# Patient Record
Sex: FEMALE | Race: WHITE | URBAN - METROPOLITAN AREA
[De-identification: names, ages, dates, MRNs, and addresses within clinical notes are randomized per-mention and may not be internally consistent; named-entity substitution may affect disease eponyms.]

---

## 2017-09-01 ENCOUNTER — IMPORTED ENCOUNTER (OUTPATIENT)
Dept: URBAN - METROPOLITAN AREA CLINIC 50 | Facility: CLINIC | Age: 52
End: 2017-09-01

## 2017-09-18 ENCOUNTER — IMPORTED ENCOUNTER (OUTPATIENT)
Dept: URBAN - METROPOLITAN AREA CLINIC 50 | Facility: CLINIC | Age: 52
End: 2017-09-18

## 2017-10-19 ENCOUNTER — IMPORTED ENCOUNTER (OUTPATIENT)
Dept: URBAN - METROPOLITAN AREA CLINIC 50 | Facility: CLINIC | Age: 52
End: 2017-10-19

## 2017-11-28 ENCOUNTER — IMPORTED ENCOUNTER (OUTPATIENT)
Dept: URBAN - METROPOLITAN AREA CLINIC 50 | Facility: CLINIC | Age: 52
End: 2017-11-28

## 2018-06-14 ENCOUNTER — IMPORTED ENCOUNTER (OUTPATIENT)
Dept: URBAN - METROPOLITAN AREA CLINIC 50 | Facility: CLINIC | Age: 53
End: 2018-06-14

## 2018-06-25 ENCOUNTER — IMPORTED ENCOUNTER (OUTPATIENT)
Dept: URBAN - METROPOLITAN AREA CLINIC 50 | Facility: CLINIC | Age: 53
End: 2018-06-25

## 2018-10-01 ENCOUNTER — IMPORTED ENCOUNTER (OUTPATIENT)
Dept: URBAN - METROPOLITAN AREA CLINIC 50 | Facility: CLINIC | Age: 53
End: 2018-10-01

## 2018-10-01 NOTE — PATIENT DISCUSSION
"""Start Artificial tears both eyes two - four times a day ."" ""Start FML Fluorometholone both eyes twice a day twice a day

## 2018-10-16 ENCOUNTER — IMPORTED ENCOUNTER (OUTPATIENT)
Dept: URBAN - METROPOLITAN AREA CLINIC 50 | Facility: CLINIC | Age: 53
End: 2018-10-16

## 2018-11-12 ENCOUNTER — IMPORTED ENCOUNTER (OUTPATIENT)
Dept: URBAN - METROPOLITAN AREA CLINIC 50 | Facility: CLINIC | Age: 53
End: 2018-11-12

## 2018-11-19 ENCOUNTER — IMPORTED ENCOUNTER (OUTPATIENT)
Dept: URBAN - METROPOLITAN AREA CLINIC 50 | Facility: CLINIC | Age: 53
End: 2018-11-19

## 2018-11-26 ENCOUNTER — IMPORTED ENCOUNTER (OUTPATIENT)
Dept: URBAN - METROPOLITAN AREA CLINIC 50 | Facility: CLINIC | Age: 53
End: 2018-11-26

## 2018-11-26 NOTE — PATIENT DISCUSSION
"""Patient given final contact lens prescription.  Instructed patient to discontinue contact lens wear and ""

## 2018-11-30 ENCOUNTER — IMPORTED ENCOUNTER (OUTPATIENT)
Dept: URBAN - METROPOLITAN AREA CLINIC 50 | Facility: CLINIC | Age: 53
End: 2018-11-30

## 2019-10-14 ENCOUNTER — IMPORTED ENCOUNTER (OUTPATIENT)
Dept: URBAN - METROPOLITAN AREA CLINIC 50 | Facility: CLINIC | Age: 54
End: 2019-10-14

## 2019-11-14 ENCOUNTER — IMPORTED ENCOUNTER (OUTPATIENT)
Dept: URBAN - METROPOLITAN AREA CLINIC 50 | Facility: CLINIC | Age: 54
End: 2019-11-14

## 2021-03-25 ENCOUNTER — IMPORTED ENCOUNTER (OUTPATIENT)
Dept: URBAN - METROPOLITAN AREA CLINIC 50 | Facility: CLINIC | Age: 56
End: 2021-03-25

## 2021-03-26 ENCOUNTER — IMPORTED ENCOUNTER (OUTPATIENT)
Dept: URBAN - METROPOLITAN AREA CLINIC 50 | Facility: CLINIC | Age: 56
End: 2021-03-26

## 2021-05-15 ASSESSMENT — TONOMETRY
OS_IOP_MMHG: 14
OS_IOP_MMHG: 12
OD_IOP_MMHG: 13
OS_IOP_MMHG: 14
OS_IOP_MMHG: 15
OD_IOP_MMHG: 14
OS_IOP_MMHG: 13
OD_IOP_MMHG: 16
OD_IOP_MMHG: 14
OD_IOP_MMHG: 13
OS_IOP_MMHG: 14
OD_IOP_MMHG: 10
OD_IOP_MMHG: 12
OS_IOP_MMHG: 12

## 2021-05-15 ASSESSMENT — VISUAL ACUITY
OD_CC: 20/60
OD_PH: 20/20-1
OS_CC: J1+ (-2)
OD_CC: 20/60
OD_CC: 20/30
OS_CC: 20/20
OD_OTHER: 20/50. 20/70.
OS_BAT: 20/50
OD_CC: 20/200
OS_CC: J1+
OS_CC: 20/20
OD_CC: J2
OD_CC: J1+
OS_CC: 20/25+2
OD_PH: @ 17 IN
OD_SC: 20/70
OS_CC: 20/25
OS_CC: J1
OD_CC: 20/80
OD_CC: 20/40-2
OD_CC: J1+ (-2)
OS_CC: J1@ 14 IN
OD_BAT: 20/50
OD_CC: J1@ 14 IN
OS_OTHER: 20/50. 20/70.
OS_CC: J2
OS_SC: 20/30
OD_PH: 20/25
OS_CC: 20/20
OS_CC: 20/20-1
OS_CC: 20/25
OD_CC: J1

## 2021-11-23 ENCOUNTER — PREPPED CHART (OUTPATIENT)
Dept: URBAN - METROPOLITAN AREA CLINIC 48 | Facility: CLINIC | Age: 56
End: 2021-11-23

## 2022-10-31 ENCOUNTER — ESTABLISHED PATIENT (OUTPATIENT)
Dept: URBAN - METROPOLITAN AREA CLINIC 53 | Facility: CLINIC | Age: 57
End: 2022-10-31

## 2022-10-31 DIAGNOSIS — Z97.3: ICD-10-CM

## 2022-10-31 DIAGNOSIS — H52.13: ICD-10-CM

## 2022-10-31 DIAGNOSIS — Z01.01: ICD-10-CM

## 2022-10-31 PROCEDURE — 92014 COMPRE OPH EXAM EST PT 1/>: CPT

## 2022-10-31 PROCEDURE — 92310-3E ESTABLISHED CL PATIENT MULTIFOCAL AND/OR MONOVISION SOFT LENS EVALUATION

## 2022-10-31 PROCEDURE — 92015 DETERMINE REFRACTIVE STATE: CPT

## 2022-10-31 ASSESSMENT — TONOMETRY
OD_IOP_MMHG: 12
OS_IOP_MMHG: 12

## 2022-10-31 ASSESSMENT — KERATOMETRY
OD_AXISANGLE_DEGREES: 87
OS_K2POWER_DIOPTERS: 44.25
OS_AXISANGLE_DEGREES: 170
OS_K1POWER_DIOPTERS: 43.25
OD_K1POWER_DIOPTERS: 44.50
OD_AXISANGLE2_DEGREES: 177
OS_AXISANGLE2_DEGREES: 80

## 2022-10-31 ASSESSMENT — VISUAL ACUITY
OD_GLARE: 20/30
OS_CC: 20/25
OD_CC: 20/200
OD_GLARE: 20/40
OS_GLARE: 20/40
OS_GLARE: 20/40
OD_PH: 20/30
OU_CC: J1+ @ 18 IN

## 2022-10-31 NOTE — PATIENT DISCUSSION
Monovision. 2 week replacement. Trial 1 ordered for patient to  and call to finalize trial or current RX.

## 2023-12-13 ENCOUNTER — COMPREHENSIVE EXAM (OUTPATIENT)
Dept: URBAN - METROPOLITAN AREA CLINIC 53 | Facility: CLINIC | Age: 58
End: 2023-12-13

## 2023-12-13 DIAGNOSIS — H16.223: ICD-10-CM

## 2023-12-13 DIAGNOSIS — Z01.01: ICD-10-CM

## 2023-12-13 DIAGNOSIS — Z97.3: ICD-10-CM

## 2023-12-13 DIAGNOSIS — H40.013: ICD-10-CM

## 2023-12-13 DIAGNOSIS — H52.13: ICD-10-CM

## 2023-12-13 DIAGNOSIS — H25.13: ICD-10-CM

## 2023-12-13 PROCEDURE — 92310-1E ESTABLISHED CL PATIENT SPHERICAL SINGLE VISION SOFT LENS EVALUATION

## 2023-12-13 PROCEDURE — 92014 COMPRE OPH EXAM EST PT 1/>: CPT

## 2023-12-13 PROCEDURE — 92015 DETERMINE REFRACTIVE STATE: CPT

## 2023-12-13 ASSESSMENT — TONOMETRY
OD_IOP_MMHG: 12
OS_IOP_MMHG: 12

## 2023-12-13 ASSESSMENT — VISUAL ACUITY
OS_CC: 20/20
OD_GLARE: 20/20
OS_GLARE: 20/20
OD_GLARE: 20/20
OS_PH: 20/20
OD_CC: 20/200
OD_PH: 20/400
OD_SC: 20/400
OU_CC: J1@16"
OS_SC: 20/400
OS_GLARE: 20/20

## 2023-12-13 ASSESSMENT — KERATOMETRY
OS_K2POWER_DIOPTERS: 44.25
OS_AXISANGLE2_DEGREES: 80
OS_AXISANGLE_DEGREES: 170
OS_K1POWER_DIOPTERS: 43.25
OD_AXISANGLE2_DEGREES: 177
OD_K1POWER_DIOPTERS: 44.50
OD_AXISANGLE_DEGREES: 87

## 2025-01-23 ENCOUNTER — COMPREHENSIVE EXAM (OUTPATIENT)
Age: 60
End: 2025-01-23

## 2025-01-23 DIAGNOSIS — Z01.01: ICD-10-CM

## 2025-01-23 DIAGNOSIS — H40.013: ICD-10-CM

## 2025-01-23 DIAGNOSIS — H25.13: ICD-10-CM

## 2025-01-23 DIAGNOSIS — H52.13: ICD-10-CM

## 2025-01-23 DIAGNOSIS — Z97.3: ICD-10-CM

## 2025-01-23 DIAGNOSIS — H16.223: ICD-10-CM

## 2025-01-23 PROCEDURE — 92015 DETERMINE REFRACTIVE STATE: CPT

## 2025-01-23 PROCEDURE — 92310-1 LEVEL 1 SOFT LENS UPDATE

## 2025-01-23 PROCEDURE — 92014 COMPRE OPH EXAM EST PT 1/>: CPT
